# Patient Record
Sex: FEMALE | Race: WHITE | NOT HISPANIC OR LATINO | URBAN - METROPOLITAN AREA
[De-identification: names, ages, dates, MRNs, and addresses within clinical notes are randomized per-mention and may not be internally consistent; named-entity substitution may affect disease eponyms.]

---

## 2017-07-04 ENCOUNTER — OFFICE VISIT - RIVER FALLS (OUTPATIENT)
Dept: FAMILY MEDICINE | Facility: CLINIC | Age: 82
End: 2017-07-04

## 2017-09-29 ENCOUNTER — OFFICE VISIT - RIVER FALLS (OUTPATIENT)
Dept: FAMILY MEDICINE | Facility: CLINIC | Age: 82
End: 2017-09-29

## 2017-11-10 ENCOUNTER — OFFICE VISIT - RIVER FALLS (OUTPATIENT)
Dept: FAMILY MEDICINE | Facility: CLINIC | Age: 82
End: 2017-11-10

## 2018-03-19 ENCOUNTER — OFFICE VISIT - RIVER FALLS (OUTPATIENT)
Dept: FAMILY MEDICINE | Facility: CLINIC | Age: 83
End: 2018-03-19

## 2018-05-24 ENCOUNTER — OFFICE VISIT - RIVER FALLS (OUTPATIENT)
Dept: FAMILY MEDICINE | Facility: CLINIC | Age: 83
End: 2018-05-24

## 2018-07-24 ENCOUNTER — OFFICE VISIT - RIVER FALLS (OUTPATIENT)
Dept: FAMILY MEDICINE | Facility: CLINIC | Age: 83
End: 2018-07-24

## 2018-09-27 ENCOUNTER — OFFICE VISIT - RIVER FALLS (OUTPATIENT)
Dept: FAMILY MEDICINE | Facility: CLINIC | Age: 83
End: 2018-09-27

## 2018-11-16 ENCOUNTER — OFFICE VISIT - RIVER FALLS (OUTPATIENT)
Dept: FAMILY MEDICINE | Facility: CLINIC | Age: 83
End: 2018-11-16

## 2019-01-01 ENCOUNTER — OFFICE VISIT - RIVER FALLS (OUTPATIENT)
Dept: FAMILY MEDICINE | Facility: CLINIC | Age: 84
End: 2019-01-01

## 2019-01-24 ENCOUNTER — OFFICE VISIT - RIVER FALLS (OUTPATIENT)
Dept: FAMILY MEDICINE | Facility: CLINIC | Age: 84
End: 2019-01-24

## 2019-03-20 ENCOUNTER — OFFICE VISIT - RIVER FALLS (OUTPATIENT)
Dept: FAMILY MEDICINE | Facility: CLINIC | Age: 84
End: 2019-03-20

## 2020-01-01 ENCOUNTER — OFFICE VISIT - RIVER FALLS (OUTPATIENT)
Dept: FAMILY MEDICINE | Facility: CLINIC | Age: 85
End: 2020-01-01

## 2022-02-12 VITALS
OXYGEN SATURATION: 99 % | SYSTOLIC BLOOD PRESSURE: 124 MMHG | TEMPERATURE: 97.9 F | HEART RATE: 68 BPM | DIASTOLIC BLOOD PRESSURE: 70 MMHG

## 2022-02-15 NOTE — PROGRESS NOTES
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 Hard to communicate. Need to read facial gestures.      Review of Systems   No longer able to feed herself. Eating is variable.  Total cares. Incontinent.  Uses wheelchair to get around. Sarah lift.  Deaf and understands sign language.  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 / ICD-9-.0 / Confirmed  DNR / SNOMED CT 4381125407 / Confirmed  Fecal incontinence / ICD-9-.60 / Confirmed  GERD (Gastroesophageal Reflux Disease) / ICD-9-.81 / Confirmed  Glaucoma / SNOMED CT 89874622 / Confirmed  Hematuria / ICD-9-.70 / Confirmed  Hypertension / ICD-9-.9 / Confirmed  Macular retinal edema / SNOMED CT 31979266 / Confirmed  Mild Depression / ICD-9-.21 / Confirmed  Normal Pressure Hydrocephalus / ICD-9-.5 / Confirmed  Obese /  ICD-9-.00 / Probable  Osteoporosis / ICD-9-.00 / Confirmed  Phlebitis and Thrombophlebitis / ICD-9-.9 / Confirmed  Tremor, Essential / ICD-9-.1 / Confirmed      Histories   Social History: Moved to [facility] about 2010      Physical Examination   VS/Measurements   B/P: 113/78  Weight: 153 lbs (November 2018). 150 lbs (September 2018). 151 lbs (July 2018). 154 lbs (May 2018). 154 lbs (March 2018). 160 lbs (December 2017). 164 lbs (October 2017). 165 lbs (June 2017). 164 lbs (April 2017). (162 lbs November 2016)   General:  No acute distress, laying in bed.    Respiratory:  Lungs are clear to auscultation.    Cardiovascular:  Normal rate, Regular rhythm.    Psychiatric:  Cooperative.       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     Hypertension (RJU74-WP I10).     GERD (Gastroesophageal Reflux Disease) (YCF82-CC K21.9).     Seizure disorder (IMK42-LJ G40.909).       Dementia: stable. Discontinued remeron and more alert  GERD: discontinued omeprazole and no problems  Seizure disorder: stable on keppra (refuses it sometimes)  Hypertension: discontinued propranolol and blood pressure controlled  Allergic rhinitis: change zyrtec to prn and rarely uses  Cardiac: discontinued aspirin  DNR

## 2022-02-15 NOTE — PROGRESS NOTES
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 Hard to communicate. Need to read facial gestures. Does not speak much.      Review of Systems   No longer able to feed herself. Eating is variable.  Total cares. Incontinent.  Uses wheelchair to get around. Sarah lift.  Deaf and understands sign language.  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 / ICD-9-.0 / Confirmed  DNR / SNOMED CT 9180341110 / Confirmed  Fecal incontinence / ICD-9-.60 / Confirmed  GERD (Gastroesophageal Reflux Disease) / ICD-9-.81 / Confirmed  Glaucoma / SNOMED CT 97239780 / Confirmed  Hematuria / ICD-9-.70 / Confirmed  Hypertension / ICD-9-.9 / Confirmed  Macular retinal edema / SNOMED CT 30717768 / Confirmed  Mild Depression / ICD-9-.21 / Confirmed  Normal Pressure Hydrocephalus / ICD-9-.5 / Confirmed  Obese / ICD-9-.00 / Probable  Osteoporosis / ICD-9-.00 / Confirmed  Phlebitis and Thrombophlebitis / ICD-9-.9 / Confirmed  Tremor, Essential / ICD-9-.1 / Confirmed      Histories   Social History: [place] about 2010      Physical Examination   VS/Measurements   B/P: 129/87  Sleeping  Weight: 162 lbs (July 2019). 158 lbs (May 2019). 158 lbs (March 2018). 156 lbs (January 2019). 153 lbs (November 2018). 150 lbs (September 2018). 151 lbs (July 2018). 154 lbs (May 2018). 154 lbs (March 2018). 160 lbs (December 2017). 164 lbs (October 2017). 165 lbs (June 2017). 164 lbs (April 2017). (162 lbs November 2016)   General:  No acute distress, sleeping in chair.    Respiratory:  Lungs are clear to auscultation.    Cardiovascular:  Normal rate, Regular rhythm.    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     Hypertension (OHL80-EV I10).     GERD (Gastroesophageal Reflux Disease) (VGC01-VZ K21.9).     Seizure disorder (HOE15-CK G40.909).       Dementia: stable. Discontinued remeron and more alert  GERD: discontinued omeprazole and no problems  Seizure disorder: stable on keppra (refuses it sometimes)  Hypertension: discontinued propranolol and blood pressure controlled  Allergic rhinitis: change zyrtec to prn and rarely uses  Cardiac: discontinued aspirin  DNR

## 2022-02-15 NOTE — PROGRESS NOTES
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 Hard to communicate. Need to read facial gestures.      Review of Systems   No longer able to feed herself. Eating is variable.  Total cares. Incontinent.  Uses wheelchair to get around. Sarah lift.  Deaf and understands sign language.  Sits in chair and sleeps alot. Less frequent outbursts.  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 / ICD-9-.0 / Confirmed  DNR / SNOMED CT 4325975155 / Confirmed  Fecal incontinence / ICD-9-.60 / Confirmed  GERD (Gastroesophageal Reflux Disease) / ICD-9-.81 / Confirmed  Glaucoma / SNOMED CT 06034567 / Confirmed  Hematuria / ICD-9-.70 / Confirmed  Hypertension / ICD-9-.9 / Confirmed  Macular retinal edema / SNOMED CT 70071210 / Confirmed  Mild Depression / ICD-9-.21 / Confirmed  Normal Pressure Hydrocephalus / ICD-9-.5 / Confirmed  Obese / ICD-9-.00 / Probable  Osteoporosis / ICD-9-.00 / Confirmed  Phlebitis and Thrombophlebitis / ICD-9-.9 / Confirmed  Tremor, Essential / ICD-9-.1 / Confirmed       Histories   Social History: [facility]. Family involvement minimal. Has a deaf visitor at times      Physical Examination   VS/Measurements   B/P: 114/78  Weight: 164 lbs (October 2017). 165 lbs (June 2017). 164 lbs (April 2017). (162 lbs November 2016)   General:  No acute distress.    Respiratory:  Lungs are clear to auscultation.    Cardiovascular:  Normal rate, Regular rhythm, 1+ edema with Shaji stockings.    Psychiatric:  Cooperative.    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     Hypertension (EWE67-YT I10).     GERD (Gastroesophageal Reflux Disease) (UKB26-IC K21.9).     Seizure disorder (RZC38-TK G40.909).       Dementia: stable. Discontinued remeron and more alert  GERD: discontinued omeprazole and no problems  Seizure disorder: stable on keppra (refuses it sometimes)  Hypertension: discontinued propranolol and blood pressure controlled  Allergic rhinitis: change zyrtec to prn and rarely uses  Cardiac: discontinued aspirin  DNR

## 2022-02-15 NOTE — PROGRESS NOTES
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 Hard to communicate. Need to read facial gestures.      Review of Systems   No longer able to feed herself. Eating is variable.  Total cares. Incontinent.  Uses wheelchair to get around. Sarah lift.  Deaf and understands sign language.  No outbursts in a long time      Health Status   Allergies:    Allergic Reactions (Selected)  Severity Not Documented  Cimetidine (No reactions were documented)   Medications:  (Selected)   Prescriptions  Prescribed  levetiracetam 250 mg oral tablet: See Instructions, Instructions: Take 1 tablet by mouth twice a day (for keppra), # 60 tab(s), 5 Refill(s), Pharmacy: [facility], [place]   Problem list:    All Problems  Acute Ischemic Left MCA Stroke / ICD-9-.91 / Confirmed  Ankle Swelling / ICD-9-.07 / Confirmed  Cerebral Parenchymal Hemorrhage / ICD-9- / Confirmed  Concussion / SNOMED CT 442836693 / Confirmed  Deaf, Nonspeaking / ICD-9-.7 / Confirmed  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 / ICD-9-.0 / Confirmed  DNR / SNOMED CT 0635364078 / Confirmed  Fecal incontinence / ICD-9-.60 / Confirmed  GERD (Gastroesophageal Reflux Disease) / ICD-9-.81 / Confirmed  Glaucoma / SNOMED CT 51439163 / Confirmed  Hematuria / ICD-9-.70 /  Confirmed  Hypertension / ICD-9-.9 / Confirmed  Macular retinal edema / SNOMED CT 36645917 / Confirmed  Mild Depression / ICD-9-.21 / Confirmed  Normal Pressure Hydrocephalus / ICD-9-.5 / Confirmed  Obese / ICD-9-.00 / Probable  Osteoporosis / ICD-9-.00 / Confirmed  Phlebitis and Thrombophlebitis / ICD-9-.9 / Confirmed  Tremor, Essential / ICD-9-.1 / Confirmed      Histories   Social History:        Alcohol Assessment: Denies Alcohol Use      Tobacco Assessment: Denies Tobacco Use      Substance Abuse Assessment: Denies Substance Abuse  , Moved to [facility] about 2010. Has a deaf visitor at times      Physical Examination   VS/Measurements   B/P: 108/67  Weight: 150 lbs (September 2018). 151 lbs (July 2018). 154 lbs (May 2018). 154 lbs (March 2018). 160 lbs (December 2017). 164 lbs (October 2017). 165 lbs (June 2017). 164 lbs (April 2017). (162 lbs November 2016)   General:  No acute distress, laying in bed.    Respiratory:  Lungs are clear to auscultation.    Cardiovascular:  Normal rate, Regular rhythm.    Psychiatric:  Cooperative.       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     Hypertension (DAA43-SX I10).     GERD (Gastroesophageal Reflux Disease) (CML24-TE K21.9).     Seizure disorder (YMS25-IU G40.909).       Dementia: stable. Discontinued remeron and more alert  GERD: discontinued omeprazole and no problems  Seizure disorder: stable on keppra (refuses it sometimes)  Hypertension: discontinued propranolol and blood pressure controlled  Allergic rhinitis: change zyrtec to prn and rarely uses  Cardiac: discontinued aspirin  DNR

## 2022-02-15 NOTE — PROGRESS NOTES
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 Hard to communicate. Need to read facial gestures.      Review of Systems   No longer able to feed herself. Eating is variable.  Total cares. Incontinent.  Uses wheelchair to get around. Sarah lift.  Deaf and understands sign language.  Sits in chair and sleeps alot. Less frequent outbursts.      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 / ICD-9-.0 / Confirmed  DNR / SNOMED CT 6515569155 / Confirmed  Fecal incontinence / ICD-9-.60 / Confirmed  GERD (Gastroesophageal Reflux Disease) / ICD-9-.81 / Confirmed  Glaucoma / SNOMED CT 21741020 / Confirmed  Hematuria / ICD-9-.70 / Confirmed  Hypertension / ICD-9-.9 / Confirmed  Macular retinal edema / SNOMED CT 47342163 / Confirmed  Mild Depression / ICD-9-.21 / Confirmed  Normal Pressure Hydrocephalus /  ICD-9-.5 / Confirmed  Obese / ICD-9-.00 / Probable  Osteoporosis / ICD-9-.00 / Confirmed  Phlebitis and Thrombophlebitis / ICD-9-.9 / Confirmed  Tremor, Essential / ICD-9-.1 / Confirmed      Histories   Social History: Moved to [facility] about 2010. Has a deaf visitor at times      Physical Examination   VS/Measurements   B/P: 115/67  Weight: 154 lbs (March 2018). 160 lbs (December 2017). 164 lbs (October 2017). 165 lbs (June 2017). 164 lbs (April 2017). (162 lbs November 2016)   General:  No acute distress.    Respiratory:  Lungs are clear to auscultation.    Cardiovascular:  Normal rate, Regular rhythm, 1+ edema with Shaji stockings.    Psychiatric:  Cooperative.       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     Hypertension (NDW62-EQ I10).     GERD (Gastroesophageal Reflux Disease) (ZIB13-FQ K21.9).     Seizure disorder (HCJ65-DM G40.909).       Dementia: stable. Discontinued remeron and more alert  GERD: discontinued omeprazole and no problems  Seizure disorder: stable on keppra (refuses it sometimes)  Hypertension: discontinued propranolol and blood pressure controlled  Allergic rhinitis: change zyrtec to prn and rarely uses  Cardiac: discontinued aspirin  DNR

## 2022-02-15 NOTE — TELEPHONE ENCOUNTER
---------------------  From: Felipa Max LPN (Phone Messages Pool ([address]))   To: [facility] Message Pool ([address] [place] [address]);     Sent: 5/6/2020 8:59:07 AM CDT  Subject: elevated vitals/morphine     Phone Message    PCP:   RUTHY      Time of Call:  8:56am       Person Calling:  Todd Diaz  Phone number:  [phone]    Note:   Jg WOLFE stating pt is on comfort cares. Her says her vitals are all elevated and respiratory rate is 42.    Jg is asking for Rx for morphine to be faxed to them at [phone]    Last office visit and reason:  _---------------------  From: Anabelle Basilio LPN ([facility] Message Pool ([facility]))   To: Andrade Huizar MD;     Sent: 5/6/2020 9:03:31 AM CDT  Subject: FW: elevated vitals/morphineTalked with nurse and placed morphine order. Given lives in nursing home will test for Coronavirus even though no fever or coughprescription printed and faxed to [facility]---------------------  From: Anabelle Basilio LPN ([facility] Message Pool ([facility]))   To: Leonardo Coronado MD;     Sent: 5/6/2020 10:52:20 AM CDT  Subject: FW: elevated vitals/morphine     [facility] called and stated the prescription needed to be signed. [facility] is home telemed today---------------------  From: Leonardo Coronado MD   To: [facility] Message Pool ([address]);     Sent: 5/6/2020 10:55:14 AM CDT  Subject: RE: elevated vitals/morphine     printedFax signed script to DESEAN

## 2022-02-15 NOTE — PROGRESS NOTES
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 Hard to communicate. Need to read facial gestures.      Review of Systems   No longer able to feed herself. Eating is variable.  Total cares. Incontinent.  Uses wheelchair to get around. Sarah lift.  Deaf and understands sign language.  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 / ICD-9-.0 / Confirmed  DNR / SNOMED CT 9216499407 / Confirmed  Fecal incontinence / ICD-9-.60 / Confirmed  GERD (Gastroesophageal Reflux Disease) / ICD-9-.81 / Confirmed  Glaucoma / SNOMED CT 98127289 / Confirmed  Hematuria / ICD-9-.70 / Confirmed  Hypertension / ICD-9-.9 / Confirmed  Macular retinal edema / SNOMED CT 90090426 / Confirmed  Mild Depression / ICD-9-.21 / Confirmed  Normal Pressure Hydrocephalus / ICD-9-.5 / Confirmed  Obese / ICD-9-.00 / Probable  Osteoporosis / ICD-9-.00 / Confirmed  Phlebitis and Thrombophlebitis / ICD-9-.9 / Confirmed  Tremor, Essential / ICD-9-.1 / Confirmed      Histories   Social History: Moved to [facility] about .  Has a deaf visitor at times      Physical Examination   VS/Measurements   B/P: 117/64  Weight: 154 lbs (May 2018). 154 lbs (March 2018). 160 lbs (December 2017). 164 lbs (October 2017). 165 lbs (June 2017). 164 lbs (April 2017). (162 lbs November 2016)   General:  No acute distress.    Respiratory:  Lungs are clear to auscultation.    Cardiovascular:  Normal rate, Regular rhythm, 1+ edema with Shaji stockings.    Psychiatric:  Cooperative.       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     Hypertension (MZW08-VX I10).     GERD (Gastroesophageal Reflux Disease) (LPJ40-NN K21.9).     Seizure disorder (OJU12-YC G40.909).       Dementia: stable. Discontinued remeron and more alert  GERD: discontinued omeprazole and no problems  Seizure disorder: stable on keppra (refuses it sometimes)  Hypertension: discontinued propranolol and blood pressure controlled  Allergic rhinitis: change zyrtec to prn and rarely uses  Cardiac: discontinued aspirin  DNR

## 2022-02-15 NOTE — PROGRESS NOTES
Chief Complaint    Possible aspiration today  History of Present Illness      Had a choking episode at lunch today. Started coughing after drinking apple juice. Did turn a little purple but then able to catch her breath and back to herself for the past couple hours. Had been normal. Diet is puréed with mechanical soft  Review of Systems      No fevers, vomiting and rashes.  Physical Exam   Vitals & Measurements    T: 97.9(Tympanic)  HR: 68(Peripheral)  BP: 124/70  SpO2: 99%       General: No acute distress.  Noncommunicative.       Lungs: Clear with no crackles       Heart: Regular rate and rhythm       Musculoskeletal: Brought over in wheelchair and she is a total assist with cares  Assessment/Plan       Cough         Nursing home had concern for aspiration.  Her oxygen saturation is normal and she is back to normal breathing.  Lungs are clear.  Do not feel any imaging or lab testing are needed.  No indication for antibiotics.  Will monitor and have speech consult to evaluate her diet and see if any changes need to be made.  Patient Information     Name:KENNA GUERIN      Address:      [place] [id number]     Sex:Female     YOB: 1929     Phone:[phone]     MRN:[id number]     FIN:[id number]     Location:[facility]     Date of Service:09/29/2017      Primary Care Physician:       Mendy Ryan MD, [phone]  Medications    Calcium 600+D, 1000 mg, po, bid    Ecotrin 325 mg oral enteric coated tablet, 325 mg= 1 tab(s), po, daily    ibuprofen, 200 mg, bid    levetiracetam 250 mg oral tablet, See Instructions, 5 refills    melatonin 3 mg oral tablet, 3 mg= 1 tab(s), po    Ocuvite, po, daily    omeprazole 20 mg oral delayed release capsule, See Instructions, 5 refills    propranolol 10 mg oral tablet, 10 mg= 1 tab(s), po, bid, 1 refills    Tylenol, 500 mg, po, bid  Allergies    cimetidine  Social History    Smoking Status - 06/12/2012     No     Alcohol -  Denies Alcohol Use, 05/15/2012     Substance Abuse - Denies Substance Abuse, 05/15/2012     Tobacco - Denies Tobacco Use, 05/15/2012  Lab Results

## 2022-02-15 NOTE — TELEPHONE ENCOUNTER
After-hours phone call was received on my personal phone at 1910 hrs. tonight.  Notified by nursing home staff at the [facility] that patient had passed away and they needed in order to release the body.  Patient has been on comfort cares.  Order was provided as requested.

## 2022-02-15 NOTE — PROGRESS NOTES
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 Hard to communicate. Need to read facial gestures.      Review of Systems   No longer able to feed herself. Eating is variable.  Total cares. Incontinent.  Uses wheelchair to get around. Sarah lift.  Deaf and understands sign language.  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 / ICD-9-.0 / Confirmed  DNR / SNOMED CT 0230028359 / Confirmed  Fecal incontinence / ICD-9-.60 / Confirmed  GERD (Gastroesophageal Reflux Disease) / ICD-9-.81 / Confirmed  Glaucoma / SNOMED CT 74129557 / Confirmed  Hematuria / ICD-9-.70 / Confirmed  Hypertension / ICD-9-.9 / Confirmed  Macular retinal edema / SNOMED CT 53721929 / Confirmed  Mild Depression / ICD-9-.21 / Confirmed  Normal Pressure Hydrocephalus / ICD-9-.5 / Confirmed  Obese / ICD-9-.00 / Probable  Osteoporosis / ICD-9-.00 / Confirmed  Phlebitis and Thrombophlebitis / ICD-9-.9 / Confirmed  Tremor, Essential / ICD-9-.1 / Confirmed      Histories   Procedure history:     Phacoemulsification right eye on 6/5/2012 at 82 Years.  Esophagogastroduodenoscopy (SNOMED CT 8272294423) performed by Gareth Agee on 3/30/2010 at 80 Years.  Comments:  6/24/2010 9:37 AM - Asha Birmingham  Dx Acute Gastritis/Small Hiatal Hernia  Cystoscopy (SNOMED CT 61967072) in 2003 at 74 Years.  Laparoscopic cholecystectomy (SNOMED CT 41549315) in 1986 at 57 Years.  [facility] - Total abdominal hysterectomy and bilateral salpingo-oophorectomy in 1981 at 52 Years.  Appendectomy (SNOMED CT 048839121) in 1963 at 34 Years.  Pregnancy (SNOMED CT 733270426).  Comments:  4/2/2010 2:36 PM - Cassandra Kemp  full term X 4   Social History: Moved to [facility] about 2010. Has a deaf visitor at times      Physical Examination   VS/Measurements   B/P: 97/60  Weight: 151 lbs (July 2018). 154 lbs (May 2018). 154 lbs (March 2018). 160 lbs (December 2017). 164 lbs (October 2017). 165 lbs (June 2017). 164 lbs (April 2017). (162 lbs November 2016)   General:  No acute distress.    Respiratory:  Lungs are clear to auscultation.    Cardiovascular:  Normal rate, Regular rhythm, 1+ edema with Shaji stockings.    Psychiatric:  Cooperative.       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     Hypertension (UBH96-ER I10).     GERD (Gastroesophageal Reflux Disease) (HKU95-LO K21.9).     Seizure disorder (AUB20-DA G40.909).       Dementia: stable. Discontinued remeron and more alert  GERD: discontinued omeprazole and no problems  Seizure disorder: stable on keppra (refuses it sometimes)  Hypertension: discontinued propranolol and blood pressure controlled  Allergic rhinitis: change zyrtec to prn and rarely uses  Cardiac: discontinued aspirin  DNR

## 2022-02-15 NOTE — PROGRESS NOTES
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 Hard to communicate. Need to read facial gestures. Does not speak much.      Review of Systems   No longer able to feed herself. Eating is variable.  Total cares. Incontinent.  Uses wheelchair to get around. Sarah lift.  Deaf and understands sign language.  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 / ICD-9-.0 / Confirmed  DNR / SNOMED CT 9395687813 / Confirmed  Fecal incontinence / ICD-9-.60 / Confirmed  GERD (Gastroesophageal Reflux Disease) / ICD-9-.81 / Confirmed  Glaucoma / SNOMED CT 81626663 / Confirmed  Hematuria / ICD-9-.70 / Confirmed  Hypertension / ICD-9-.9 / Confirmed  Macular retinal edema / SNOMED CT 12162526 / Confirmed  Mild Depression / ICD-9-.21 / Confirmed  Normal Pressure Hydrocephalus / ICD-9-.5 / Confirmed  Obese / ICD-9-.00 / Probable  Osteoporosis / ICD-9-.00 / Confirmed  Phlebitis and  Thrombophlebitis / ICD-9-.9 / Confirmed  Tremor, Essential / ICD-9-.1 / Confirmed      Histories   Social History: Moved to [facility] about 2010      Physical Examination   VS/Measurements   B/P: 110/74  Sleeping  Weight:  166lbs (November 2019).166 lbs (September 2019). 162 lbs (July 2019). 158 lbs (May 2019). 158 lbs (March 2018). 156 lbs (January 2019). 153 lbs (November 2018). 150 lbs (September 2018). 151 lbs (July 2018). 154 lbs (May 2018). 154 lbs (March 2018). 160 lbs (December 2017). 164 lbs (October 2017). 165 lbs (June 2017). 164 lbs (April 2017). (162 lbs November 2016)   General:  No acute distress, sleeping in chair.    Respiratory:  Lungs are clear to auscultation.    Cardiovascular:  Normal rate, Regular rhythm.    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     Hypertension (AOQ46-DX I10).     GERD (Gastroesophageal Reflux Disease) (IKS41-RF K21.9).     Seizure disorder (BJQ48-ZS G40.909).       Dementia: stable. Discontinued remeron and more alert  GERD: discontinued omeprazole and no problems  Seizure disorder: stable on keppra (refuses it sometimes)  Hypertension: discontinued propranolol and blood pressure controlled  Allergic rhinitis: change zyrtec to prn and rarely uses  Cardiac: discontinued aspirin  DNR

## 2022-02-15 NOTE — PROGRESS NOTES
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 Hard to communicate. Need to read facial gestures. Does not speak much.      Review of Systems   No longer able to feed herself. Eating is variable.  Total cares. Incontinent.  Uses wheelchair to get around. Sarah lift.  Deaf and understands sign language.  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 / ICD-9-.0 / Confirmed  DNR / SNOMED CT 2076599735 / Confirmed  Fecal incontinence / ICD-9-.60 / Confirmed  GERD (Gastroesophageal Reflux Disease) / ICD-9-.81 / Confirmed  Glaucoma / SNOMED CT 33417089 / Confirmed  Hematuria / ICD-9-.70 / Confirmed  Hypertension / ICD-9-.9 / Confirmed  Macular retinal edema / SNOMED CT 89210912 / Confirmed  Mild Depression / ICD-9-.21 / Confirmed  Normal Pressure Hydrocephalus / ICD-9-.5 / Confirmed  Obese / ICD-9-.00 / Probable  Osteoporosis / ICD-9-.00 / Confirmed  Phlebitis and Thrombophlebitis / ICD-9-.9 / Confirmed  Tremor, Essential / ICD-9-.1 / Confirmed      Histories   Social History: [place] about [place]       Physical Examination   VS/Measurements   B/P: 130/80  Sleeping  Weight:  165 lbs (January 2020). 166lbs (November 2019).166 lbs (September 2019). 162 lbs (July 2019). 158 lbs (May 2019). 158 lbs (March 2018). 156 lbs (January 2019). 153 lbs (November 2018). 150 lbs (September 2018). 151 lbs (July 2018). 154 lbs (May 2018). 154 lbs (March 2018). 160 lbs (December 2017). 164 lbs (October 2017). 165 lbs (June 2017). 164 lbs (April 2017). (162 lbs November 2016)   General:  No acute distress, sleeping in chair.    Respiratory:  Lungs are clear to auscultation.    Cardiovascular:  Normal rate, Regular rhythm.    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     Hypertension (YIV35-CH I10).     GERD (Gastroesophageal Reflux Disease) (GOF36-TO K21.9).     Seizure disorder (BTS05-AH G40.909).       Dementia: stable. Discontinued remeron and more alert  GERD: discontinued omeprazole and no problems  Seizure disorder: stable on keppra (refuses it sometimes)  Hypertension: discontinued propranolol and blood pressure controlled  Allergic rhinitis: change zyrtec to prn and rarely uses  Cardiac: discontinued aspirin  DNR

## 2022-02-15 NOTE — PROGRESS NOTES
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 Son visits and unable to communicate with her anymore.      Review of Systems   No longer able to feed herself. Eating is variable.  Total cares. Incontinent.  Uses wheelchair to get around. Sarah lift.  Deaf and understands sign language.  Sits in chair and sleeps alot. Less frequent outbursts.  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 / ICD-9-.0 / Confirmed  DNR / SNOMED CT 1781342034 / Confirmed  Fecal incontinence / ICD-9-.60 / Confirmed  GERD (Gastroesophageal Reflux Disease) / ICD-9-.81 / Confirmed  Glaucoma / SNOMED CT 48918325 / Confirmed  Hematuria / ICD-9-.70 / Confirmed  Hypertension / ICD-9-.9 / Confirmed  Macular retinal edema / SNOMED CT 96603824 / Confirmed  Mild Depression / ICD-9-.21 / Confirmed  Normal Pressure Hydrocephalus / ICD-9-.5 / Confirmed  Obese / ICD-9-.00 / Probable  Osteoporosis / ICD-9-.00 / Confirmed  Phlebitis and Thrombophlebitis / ICD-9-.9 / Confirmed  Tremor, Essential / ICD-9-.1 /  Confirmed      Histories   Social History: Moved to [facility] about 2010      Physical Examination   VS/Measurements   B/P: 116/84  Weight: 167 lbs (September 2017). 165 lbs (June 2017). 164 lbs (April 2017). (162 lbs November 2016)   General:  No acute distress.    Respiratory:  Lungs are clear to auscultation.    Cardiovascular:  Normal rate, Regular rhythm, 1+ edema with Shaji stockings.    Psychiatric:  Cooperative.    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     Hypertension (SYP26-GE I10).     GERD (Gastroesophageal Reflux Disease) (NCK14-VR K21.9).     Seizure disorder (COT27-VC G40.909).       Dementia: stable. Discontinued remeron May 2017 and more alert  GERD: discontinued omeprazole and no problems  Seizure disorder: stable on keppra (refuses it sometimes)  Hypertension: discontinued propranolol and blood pressure controlled  Allergic rhinitis: change zyrtec to prn and rarely uses  Cardiac: discontinued aspirin  DNR

## 2022-02-15 NOTE — PROGRESS NOTES
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 Hard to communicate. Need to read facial gestures. Does not speak much.      Review of Systems   No longer able to feed herself. Eating is variable.  Total cares. Incontinent.  Uses wheelchair to get around. Sarah lift.  Deaf and understands sign language.  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 / ICD-9-.0 / Confirmed  DNR / SNOMED CT 7262547754 / Confirmed  Fecal incontinence / ICD-9-.60 / Confirmed  GERD (Gastroesophageal Reflux Disease) / ICD-9-.81 / Confirmed  Glaucoma / SNOMED CT 00498373 / Confirmed  Hematuria / ICD-9-.70 / Confirmed  Hypertension / ICD-9-.9 / Confirmed  Macular retinal edema / SNOMED CT 43757346 / Confirmed  Mild Depression / ICD-9-.21 / Confirmed  Normal Pressure Hydrocephalus / ICD-9-.5  / Confirmed  Obese / ICD-9-.00 / Probable  Osteoporosis / ICD-9-.00 / Confirmed  Phlebitis and Thrombophlebitis / ICD-9-.9 / Confirmed  Tremor, Essential / ICD-9-.1 / Confirmed      Histories   Procedure history:    Phacoemulsification right eye on 6/5/2012 at 82 Years.  Esophagogastroduodenoscopy (SNOMED CT 3224572179) performed by Gareth Agee on 3/30/2010 at 80 Years.  Comments:  6/24/2010 9:37 AM CDT - Asha Birmingham  Dx Acute Gastritis/Small Hiatal Hernia  Cystoscopy (SNOMED CT 73321383) in 2003 at 74 Years.  Laparoscopic cholecystectomy (SNOMED CT 20305948) in 1986 at 57 Years.  [facility] - Total abdominal hysterectomy and bilateral salpingo-oophorectomy in 1981 at 52 Years.  Appendectomy (SNOMED CT 332581551) in 1963 at 34 Years.  Pregnancy (SNOMED CT 934684092).  Comments:  4/2/2010 2:36 PM CDT - Cassandra Kemp  full term X 4   Social History: Moved to [facility] about 2010      Physical Examination   VS/Measurements   B/P: 115/55  Weight: 158 lbs (May 2019). 158 lbs (March 2018). 156 lbs (January 2019). 153 lbs (November 2018). 150 lbs (September 2018). 151 lbs (July 2018). 154 lbs (May 2018). 154 lbs (March 2018). 160 lbs (December 2017). 164 lbs (October 2017). 165 lbs (June 2017). 164 lbs (April 2017). (162 lbs November 2016)   General:  No acute distress, sleeping in chair.    Respiratory:  Lungs are clear to auscultation.    Cardiovascular:  Normal rate, Regular rhythm.    Psychiatric:  Cooperative.    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     Hypertension (WQO41-WA I10).     GERD (Gastroesophageal Reflux Disease) (DMA03-SE K21.9).     Seizure disorder (VPB11-MK G40.909).       Dementia: stable. Discontinued remeron and more alert  GERD: discontinued omeprazole and no problems  Seizure disorder: stable on keppra (refuses it sometimes)  Hypertension: discontinued  propranolol and blood pressure controlled  Allergic rhinitis: change zyrtec to prn and rarely uses  Cardiac: discontinued aspirin  DNR

## 2022-02-15 NOTE — PROGRESS NOTES
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 Hard to communicate. Need to read facial gestures. Does not speak much.      Review of Systems   No longer able to feed herself. Eating is variable.  Total cares. Incontinent.  Uses wheelchair to get around. Sarah lift.  Deaf and understands sign language.  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 / ICD-9-.0 / Confirmed  DNR / SNOMED CT 6630468254 / Confirmed  Fecal incontinence / ICD-9-.60 / Confirmed  GERD (Gastroesophageal Reflux Disease) / ICD-9-.81 / Confirmed  Glaucoma / SNOMED CT 50729762 / Confirmed  Hematuria / ICD-9-.70 / Confirmed  Hypertension / ICD-9-.9 / Confirmed  Macular retinal edema / SNOMED CT 93393629 / Confirmed  Mild Depression / ICD-9-.21 / Confirmed  Normal Pressure Hydrocephalus / ICD-9-.5 / Confirmed  Obese / ICD-9-.00 / Probable  Osteoporosis / ICD-9-.00 / Confirmed  Phlebitis and Thrombophlebitis / ICD-9-.9 / Confirmed  Tremor, Essential / ICD-9-.1 / Confirmed      Histories   Procedure history:     Phacoemulsification right eye on 6/5/2012 at 82 Years.  Esophagogastroduodenoscopy (SNOMED CT 8799554122) performed by Gareth Agee on 3/30/2010 at 80 Years.  Comments:  6/24/2010 9:37 AM CDT - Asha Birmingham  Dx Acute Gastritis/Small Hiatal Hernia  Cystoscopy (SNOMED CT 86249239) in 2003 at 74 Years.  Laparoscopic cholecystectomy (SNOMED CT 05184290) in 1986 at 57 Years.  [facility] - Total abdominal hysterectomy and bilateral salpingo-oophorectomy in 1981 at 52 Years.  Appendectomy (SNOMED CT 805336716) in 1963 at 34 Years.  Pregnancy (SNOMED CT 389635644).  Comments:  4/2/2010 2:36 PM CDT - Cassandra Kemp  full term X 4   Social History: Moved to [facility] about 2010      Physical Examination   VS/Measurements   B/P: 130/83  Weight: 156 lbs (January 2019). 153 lbs (November 2018). 150 lbs (September 2018). 151 lbs (July 2018). 154 lbs (May 2018). 154 lbs (March 2018). 160 lbs (December 2017). 164 lbs (October 2017). 165 lbs (June 2017). 164 lbs (April 2017). (162 lbs November 2016)   General:  No acute distress, laying in bed.    Respiratory:  Lungs are clear to auscultation.    Cardiovascular:  Normal rate, Regular rhythm.    Psychiatric:  Cooperative.       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     Hypertension (ANI63-DU I10).     GERD (Gastroesophageal Reflux Disease) (EWA87-BI K21.9).     Seizure disorder (ADT95-FB G40.909).       Dementia: stable. Discontinued remeron and more alert  GERD: discontinued omeprazole and no problems  Seizure disorder: stable on keppra (refuses it sometimes)  Hypertension: discontinued propranolol and blood pressure controlled  Allergic rhinitis: change zyrtec to prn and rarely uses  Cardiac: discontinued aspirin  DNR

## 2022-02-15 NOTE — PROGRESS NOTES
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 Hard to communicate. Need to read facial gestures. Does not speak much.      Review of Systems   No longer able to feed herself. Eating is variable.  Total cares. Incontinent.  Uses wheelchair to get around. Sarah lift.  Deaf and understands sign language.  No outbursts in a long time      Health Status   Allergies:    Allergic Reactions (Selected)  Severity Not Documented  Cimetidine (No reactions were documented)   Medications:  (Selected)   Prescriptions  Prescribed  levetiracetam 250 mg oral tablet: See Instructions, Instructions: Take 1 tablet by mouth twice a day (for keppra), # 60 tab(s), 5 Refill(s), Pharmacy: [facility], [place]   Problem list:    All Problems  Acute Ischemic Left MCA Stroke / ICD-9-.91 / Confirmed  Ankle Swelling / ICD-9-.07 / Confirmed  Cerebral Parenchymal Hemorrhage / ICD-9- / Confirmed  Concussion / SNOMED CT 355755774 / Confirmed  Deaf, Nonspeaking / ICD-9-.7 / Confirmed  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 / ICD-9-.0 / Confirmed  DNR / SNOMED CT 2199698997 / Confirmed  Fecal incontinence / ICD-9-.60 / Confirmed  GERD (Gastroesophageal Reflux Disease) / ICD-9-.81 / Confirmed  Glaucoma / SNOMED CT 00647348 / Confirmed  Hematuria / ICD-9-.70 / Confirmed  Hypertension / ICD-9-.9 / Confirmed  Macular retinal edema / SNOMED CT 20729819 / Confirmed  Mild Depression / ICD-9-.21 / Confirmed  Normal Pressure Hydrocephalus / ICD-9-.5 / Confirmed  Obese / ICD-9-CM  278.00 / Probable  Osteoporosis / ICD-9-.00 / Confirmed  Phlebitis and Thrombophlebitis / ICD-9-.9 / Confirmed  Tremor, Essential / ICD-9-.1 / Confirmed      Histories   Social History: Moved to [facility] about 2010      Physical Examination   VS/Measurements   B/P: 124/78  Sleeping  Weight: 166 lbs (September 2019). 162 lbs (July 2019). 158 lbs (May 2019). 158 lbs (March 2018). 156 lbs (January 2019). 153 lbs (November 2018). 150 lbs (September 2018). 151 lbs (July 2018). 154 lbs (May 2018). 154 lbs (March 2018). 160 lbs (December 2017). 164 lbs (October 2017). 165 lbs (June 2017). 164 lbs (April 2017). (162 lbs November 2016)   General:  No acute distress, sleeping in chair.    Respiratory:  Lungs are clear to auscultation.    Cardiovascular:  Normal rate, Regular rhythm.    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     Hypertension (ZYD76-KS I10).     GERD (Gastroesophageal Reflux Disease) (BBF65-EK K21.9).     Seizure disorder (BDJ28-QH G40.909).       Dementia: stable. Discontinued remeron and more alert  GERD: discontinued omeprazole and no problems  Seizure disorder: stable on keppra (refuses it sometimes)  Hypertension: discontinued propranolol and blood pressure controlled  Allergic rhinitis: change zyrtec to prn and rarely uses  Cardiac: discontinued aspirin  DNR

## 2022-02-15 NOTE — PROGRESS NOTES
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 Hard to communicate. Need to read facial gestures. Does not speak much.      Review of Systems   No longer able to feed herself. Eating is variable.  Total cares. Incontinent.  Uses wheelchair to get around. Sarah lift.  Deaf and understands sign language.  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 / ICD-9-.0 / Confirmed  DNR / SNOMED CT 6029438863 / Confirmed  Fecal incontinence / ICD-9-.60 / Confirmed  GERD (Gastroesophageal Reflux Disease) / ICD-9-.81 / Confirmed  Glaucoma / SNOMED CT 31661598 / Confirmed  Hematuria / ICD-9-.70 / Confirmed  Hypertension / ICD-9-.9 / Confirmed  Macular retinal edema / SNOMED CT 16690834 / Confirmed  Mild Depression / ICD-9-.21 / Confirmed  Normal Pressure Hydrocephalus / ICD-9-.5 / Confirmed  Obese / ICD-9-.00 / Probable  Osteoporosis / ICD-9-.00 / Confirmed  Phlebitis and Thrombophlebitis / ICD-9-.9 / Confirmed  Tremor, Essential / ICD-9-.1 / Confirmed      Histories   Social History:  Moved to [facility] about 2010       Physical Examination   VS/Measurements   B/P: 110/59  Awake in bed  Weight:  160 lbs (March 2020). 165 lbs (January 2020). 166lbs (November 2019).166 lbs (September 2019). 162 lbs (July 2019). 158 lbs (May 2019). 158 lbs (March 2018). 156 lbs (January 2019). 153 lbs (November 2018). 150 lbs (September 2018). 151 lbs (July 2018). 154 lbs (May 2018). 154 lbs (March 2018). 160 lbs (December 2017). 164 lbs (October 2017). 165 lbs (June 2017). 164 lbs (April 2017). (162 lbs November 2016)   General:  No acute distress, sleeping in chair.    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     Hypertension (DEE88-ZL I10).     GERD (Gastroesophageal Reflux Disease) (UCZ27-ZO K21.9).     Seizure disorder (HOF60-HT G40.909).       Dementia: stable. Discontinued remeron and more alert  GERD: discontinued omeprazole and no problems  Seizure disorder: stable on keppra (refuses it sometimes)  Hypertension: discontinued propranolol and blood pressure controlled  Allergic rhinitis: change zyrtec to prn and rarely uses  Cardiac: discontinued aspirin  DNR

## 2022-02-15 NOTE — PROGRESS NOTES
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 Hard to communicate. Need to read facial gestures. Does not speak much.      Review of Systems   No longer able to feed herself. Eating is variable.  Total cares. Incontinent.  Uses wheelchair to get around. Sarah lift.  Deaf and understands sign language.  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 / ICD-9-.0 / Confirmed  DNR / SNOMED CT 0071354057 / Confirmed  Fecal incontinence / ICD-9-.60 / Confirmed  GERD (Gastroesophageal Reflux Disease) / ICD-9-.81 / Confirmed  Glaucoma / SNOMED CT 01530572 / Confirmed  Hematuria / ICD-9-.70 / Confirmed  Hypertension / ICD-9-.9 / Confirmed  Macular retinal edema / SNOMED CT 15677956 / Confirmed  Mild Depression / ICD-9-.21 / Confirmed  Normal Pressure Hydrocephalus / ICD-9-.5 / Confirmed  Obese / ICD-9-.00 / Probable  Osteoporosis / ICD-9-.00 / Confirmed  Phlebitis and Thrombophlebitis / ICD-9-.9 / Confirmed  Tremor, Essential / ICD-9-.1 / Confirmed      Histories   Social History: [place] 2010      Physical Examination   VS/Measurements   B/P: 127/67  Weight: 158 lbs (March 2018). 156 lbs (January 2019). 153 lbs (November 2018). 150 lbs (September 2018). 151 lbs (July 2018). 154 lbs (May 2018). 154 lbs (March 2018). 160 lbs (December 2017). 164 lbs (October 2017). 165 lbs (June 2017). 164 lbs (April 2017). (162 lbs November 2016)   General:  No acute distress, sleeping in chair.    Respiratory:  Lungs are clear to auscultation.    Cardiovascular:  Normal rate, Regular rhythm.    Psychiatric:  Cooperative.    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     Hypertension (WET63-QB I10).     GERD (Gastroesophageal Reflux Disease) (HUV55-RG K21.9).     Seizure disorder (PIT57-CO G40.909).       Dementia: stable. Discontinued remeron and more alert  GERD: discontinued omeprazole and no problems  Seizure disorder: stable on keppra (refuses it sometimes)  Hypertension: discontinued propranolol and blood pressure controlled  Allergic rhinitis: change zyrtec to prn and rarely uses  Cardiac: discontinued aspirin  DNR

## 2022-02-15 NOTE — PROGRESS NOTES
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 Hard to communicate. Need to read facial gestures.      Review of Systems   No longer able to feed herself. Eating is variable.  Total cares. Incontinent.  Uses wheelchair to get around. Sarah lift.  Deaf and understands sign language.  Sits in chair and sleeps alot. Less frequent outbursts.  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 / ICD-9-.0 / Confirmed  DNR / SNOMED CT 1537361127 / Confirmed  Fecal incontinence / ICD-9-.60 / Confirmed  GERD (Gastroesophageal Reflux Disease) / ICD-9-.81 / Confirmed  Glaucoma / SNOMED CT 15223003 / Confirmed  Hematuria / ICD-9-.70 / Confirmed  Hypertension / ICD-9-.9 / Confirmed  Macular retinal edema / SNOMED CT 85432672 / Confirmed  Mild Depression / ICD-9-.21 / Confirmed  Normal Pressure Hydrocephalus / ICD-9-.5 / Confirmed  Obese / ICD-9-.00 / Probable  Osteoporosis / ICD-9-.00 / Confirmed  Phlebitis and Thrombophlebitis / ICD-9-.9 / Confirmed  Tremor, Essential / ICD-9-.1 / Confirmed       Histories   Social History: Moved to [facility] about 2010. Family involvement minimal. Has a deaf visitor at times      Physical Examination   VS/Measurements   B/P: 115/84  Weight: 160 lbs (December 2017). 164 lbs (October 2017). 165 lbs (June 2017). 164 lbs (April 2017). (162 lbs November 2016)   General:  No acute distress.    Respiratory:  Lungs are clear to auscultation.    Cardiovascular:  Normal rate, Regular rhythm, 1+ edema with Shaji stockings.    Psychiatric:  Cooperative.    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     Hypertension (VBO97-JO I10).     GERD (Gastroesophageal Reflux Disease) (KKE93-QF K21.9).     Seizure disorder (EWG82-HN G40.909).       Dementia: stable. Discontinued remeron and more alert  GERD: discontinued omeprazole and no problems  Seizure disorder: stable on keppra (refuses it sometimes)  Hypertension: discontinued propranolol and blood pressure controlled  Allergic rhinitis: change zyrtec to prn and rarely uses  Cardiac: discontinued aspirin  DNR